# Patient Record
Sex: FEMALE | Race: WHITE | NOT HISPANIC OR LATINO | Employment: FULL TIME | URBAN - METROPOLITAN AREA
[De-identification: names, ages, dates, MRNs, and addresses within clinical notes are randomized per-mention and may not be internally consistent; named-entity substitution may affect disease eponyms.]

---

## 2022-01-28 ENCOUNTER — LAB (OUTPATIENT)
Dept: FAMILY MEDICINE | Facility: CLINIC | Age: 22
End: 2022-01-28
Payer: COMMERCIAL

## 2022-01-28 DIAGNOSIS — Z20.822 SUSPECTED COVID-19 VIRUS INFECTION: ICD-10-CM

## 2022-01-28 PROCEDURE — U0003 INFECTIOUS AGENT DETECTION BY NUCLEIC ACID (DNA OR RNA); SEVERE ACUTE RESPIRATORY SYNDROME CORONAVIRUS 2 (SARS-COV-2) (CORONAVIRUS DISEASE [COVID-19]), AMPLIFIED PROBE TECHNIQUE, MAKING USE OF HIGH THROUGHPUT TECHNOLOGIES AS DESCRIBED BY CMS-2020-01-R: HCPCS

## 2022-01-28 PROCEDURE — U0005 INFEC AGEN DETEC AMPLI PROBE: HCPCS

## 2022-01-29 LAB — SARS-COV-2 RNA RESP QL NAA+PROBE: NEGATIVE

## 2022-02-06 ENCOUNTER — HEALTH MAINTENANCE LETTER (OUTPATIENT)
Age: 22
End: 2022-02-06

## 2022-09-26 ENCOUNTER — OFFICE VISIT (OUTPATIENT)
Dept: OBGYN | Facility: CLINIC | Age: 22
End: 2022-09-26
Payer: COMMERCIAL

## 2022-09-26 VITALS — SYSTOLIC BLOOD PRESSURE: 102 MMHG | DIASTOLIC BLOOD PRESSURE: 60 MMHG | WEIGHT: 165 LBS

## 2022-09-26 DIAGNOSIS — Z30.011 ENCOUNTER FOR INITIAL PRESCRIPTION OF CONTRACEPTIVE PILLS: Primary | ICD-10-CM

## 2022-09-26 DIAGNOSIS — E28.2 PCOS (POLYCYSTIC OVARIAN SYNDROME): ICD-10-CM

## 2022-09-26 DIAGNOSIS — Z30.432 ENCOUNTER FOR IUD REMOVAL: ICD-10-CM

## 2022-09-26 PROCEDURE — 99204 OFFICE O/P NEW MOD 45 MIN: CPT | Mod: 25 | Performed by: OBSTETRICS & GYNECOLOGY

## 2022-09-26 PROCEDURE — 58301 REMOVE INTRAUTERINE DEVICE: CPT | Performed by: OBSTETRICS & GYNECOLOGY

## 2022-09-26 RX ORDER — CLINDAMYCIN PHOSPHATE 10 UG/ML
LOTION TOPICAL
COMMUNITY
Start: 2021-02-11 | End: 2023-08-07

## 2022-09-26 RX ORDER — LEVONORGESTREL / ETHINYL ESTRADIOL AND ETHINYL ESTRADIOL 150-30(84)
1 KIT ORAL DAILY
Qty: 91 TABLET | Refills: 3 | Status: SHIPPED | OUTPATIENT
Start: 2022-09-26 | End: 2023-08-07

## 2022-09-26 NOTE — PATIENT INSTRUCTIONS
"You can have fewer periods every year by taking your birth control pills continuously. After taking your pills regularly for 3 weeks, just skip the placebo pills (they will be a different color in your pack) and start the next pack. You can continue doing this as many packs in a row as you would like, though you may develop spotting or breakthrough bleeding. Most women prefer to take a 5 day pill break every 3 packs or 3 months to have a withdrawal bleed and \"reset\" the lining of the uterus.      Polycystic ovary syndrome (PCOS) is an important cause of both menstrual irregularity and androgen excess in women. It can cause irregular menstrual cycles, hirsutism, obesity, insulin resistance, and anovulatory infertility. Due to PCOS you may be at increased risk of type 2 diabetes, cardiovascular disease, endometrial hyperplasia, endometrial cancer, and infertility.   Diet and exercise for weight reduction are the first steps for overweight and obese women with PCOS. Weight loss can restore ovulatory cycles and improve metabolic risk. A lower carb diet is often beneficial. I recommended that you consider a modified and less strict version of Whole30 or Paleo, or a diabetic diet, with an emphasis on whole, unprocessed foods and high-quality carbohydrate sources, like vegetables, fruits, and complex, unprocessed grains.    Oral contraceptives (OCs) are the mainstay of pharmacologic therapy for women with PCOS for managing hyperandrogenism, treating menstrual dysfunction and protecting endometrial lining. Additionally, these provide contraception.   For women with PCOS who choose not to or cannot take OCs, there are alternative treatments for endometrial protection including intermittent or continuous progestin therapy, or a progestin-releasing intrauterine device (IUD). Depo provera, Nuvaring, the patch, and Nexplanon may also provide this protection in addition to contraception.  Additionally discussed metformin as an " alternative that can restore ovulatory menses in some women with PCOS, and that this can be particularly beneficial in women with documented hyperinsulinemia.

## 2022-09-26 NOTE — PROGRESS NOTES
SUBJECTIVE:   CC: Contraceptive counseling, acne/hirsuitism                                               Shantelle Mcclelland is a 21 year old female,, No LMP recorded. (Menstrual status: IUD). who presents today for mirena  IUD removal and contraceptive counseling. Her current IUD was placed 2 years ago. She previously a Bethany and liked it, however when she was due for replacement got a Mirena and has noted increased acne, hirsutism and weight gain with the IUD. She requests removal of the IUD because she wants to change her method of contraception.  Tried topical cleocin for acne with no success.  On further questioning, she reports that she has never had a regular period and always struggled with acne, but it became cystic after Mirena use.   She is interested in management of contraception, acne, and hirsutism.     Pap NIL 20    Problem list and histories reviewed & adjusted, as indicated.  Additional history: as documented.    ROS:  Const: 10lb weight gain in 1 year, no fever, chills  Skin: +acne and hirsutism    There is no problem list on file for this patient.    Past Surgical History:   Procedure Laterality Date     [place] teeth        Social History     Tobacco Use     Smoking status: Never Smoker     Smokeless tobacco: Never Used   Substance Use Topics     Alcohol use: Yes           clindamycin (CLEOCIN T) 1 % external lotion,     No current facility-administered medications on file prior to visit.    Allergies   Allergen Reactions     Sulfa Drugs Hives     Cephalosporins Hives and Rash     Clarithromycin Rash     Has been able to take azithromycin in the past without hives or rash.     Penicillins Hives and Rash       OBJECTIVE:   /60   Wt 74.8 kg (165 lb)    Const: sitting in chair in no acute distress, comfortable  Eyes: no scleral icterus, EOMI  CV: regular rate, well perfused  Pulm: no increased work of breathing, no cough  Skin: warm and dry, no rashes/lesions  Psych: mood stable,  appropriate affect  Abd: soft, no hepatosplenomegaly, non-tender to palpation  Neuro: A+Ox3   : External genitalia normal well-estrogenized, healthy tissue.  No obvious excoriations, lesions, or rashes. Bartholins, urethra, normal.  Normal moist pink vaginal mucosa.  SSE: Normal cervix, normal physiologic discharge. IUD strings visualized.    IUD Removal Procedure:    A speculum exam was performed and the cervix was visualized. The IUD string was visualized. Using ring forceps, the string  was grasped and the IUD removed intact.     ASSESSMENT/PLAN:                                                    Shantelle Mcclelland is a 21 year old female  here for contraception counseling. Her history of irregular periods, acne, and hirsutism predate IUD use and together due meet criteria for PCOS. We discussed polycystic ovarian syndrome at length. Explained that polycystic ovary syndrome (PCOS) is an important cause of both menstrual irregularity and androgen excess in women. Discussed that it can cause irregular menstrual cycles, hirsutism, obesity, insulin resistance, and anovulatory infertility. Specifically explained that due to PCOS she may be at increased risk of type 2 diabetes, cardiovascular disease, endometrial hyperplasia, endometrial cancer, and infertility.   Counseled Shantelle Mcclelland that diet and exercise for weight reduction are the first steps for overweight and obese women with PCOS. Weight loss can restore ovulatory cycles and improve metabolic risk.   Discussed that oral contraceptives (OCs) are the mainstay of pharmacologic therapy for women with PCOS for managing hyperandrogenism, treating menstrual dysfunction and protecting endometrial lining. Additionally, these provide contraception. Also reviewed laser hair removal or electrolysis for existing terminal hair growth. Discussed retinoic acid for acne control if needed can refer to Derm.  For women with PCOS who choose not to or cannot take OCs, we  discussed alternative treatments for endometrial protection including intermittent or continuous progestin therapy, or a progestin-releasing intrauterine device (IUD). Depo provera, Nuvaring, the patch, and Nexplanon may also provide this protection in addition to contraception.  She prefers continuous use oral contraceptives.       1. Encounter for initial prescription of contraceptive pills  - levonorgest-eth estrad 91-Day (SEASONIQUE) 0.15-0.03 &0.01 MG tablet; Take 1 tablet by mouth daily  Dispense: 91 tablet; Refill: 3    2. PCOS (polycystic ovarian syndrome)  - see counseling above.       3. IUD removal  - advised back up method for 7 days while initiating oral contraceptives.     Plan for now is oral contraceptives with follow up in 3 months.   In addition to the above listed procedure, 45 minutes spent on the date of the encounter doing chart review, patient visit and documentation      Cherelle Bray MD  Obstetrics and Gynecology   [facility]

## 2022-09-26 NOTE — NURSING NOTE
Chief Complaint   Patient presents with     IUD     Patient has Mirena IUD, and had it inserted 2020. Is not happy with this method of contraception. Patient reports weight gain, facial hair growth. Patient does not get period, but has occasional cramping.        Initial /60   Wt 74.8 kg (165 lb)  There is no height or weight on file to calculate BMI.  BP completed using cuff size: regular    Questioned patient about current smoking habits.  Pt. has never smoked.          The following HM Due: pap smear      Jessika Hernandez CMA

## 2022-10-03 ENCOUNTER — HEALTH MAINTENANCE LETTER (OUTPATIENT)
Age: 22
End: 2022-10-03

## 2023-01-09 ENCOUNTER — OFFICE VISIT (OUTPATIENT)
Dept: OBGYN | Facility: CLINIC | Age: 23
End: 2023-01-09
Payer: COMMERCIAL

## 2023-01-09 VITALS — DIASTOLIC BLOOD PRESSURE: 70 MMHG | SYSTOLIC BLOOD PRESSURE: 112 MMHG | WEIGHT: 170.6 LBS

## 2023-01-09 DIAGNOSIS — N93.9 ABNORMAL UTERINE BLEEDING (AUB): ICD-10-CM

## 2023-01-09 DIAGNOSIS — L68.0 HIRSUTISM: ICD-10-CM

## 2023-01-09 DIAGNOSIS — Z30.015 ENCOUNTER FOR INITIAL PRESCRIPTION OF VAGINAL RING HORMONAL CONTRACEPTIVE: ICD-10-CM

## 2023-01-09 DIAGNOSIS — L70.0 CYSTIC ACNE: ICD-10-CM

## 2023-01-09 DIAGNOSIS — E28.2 PCOS (POLYCYSTIC OVARIAN SYNDROME): Primary | ICD-10-CM

## 2023-01-09 PROCEDURE — 99214 OFFICE O/P EST MOD 30 MIN: CPT | Performed by: OBSTETRICS & GYNECOLOGY

## 2023-01-09 RX ORDER — ETONOGESTREL AND ETHINYL ESTRADIOL VAGINAL RING .015; .12 MG/D; MG/D
1 RING VAGINAL
Qty: 3 EACH | Refills: 4 | Status: SHIPPED | OUTPATIENT
Start: 2023-01-09

## 2023-01-09 NOTE — NURSING NOTE
Chief Complaint   Patient presents with     Medication Follow-up     Patient has PCOS, and was seen on 22 and started taking Seasonique to help with PCOS symptoms. Patient is not sure she would like to continue taking the pill- she has a hard time remembering to take it on time, didn't really help with her acne. Would like to discuss other options.        Initial /70   Wt 77.4 kg (170 lb 9.6 oz)  There is no height or weight on file to calculate BMI.  BP completed using cuff size: regular    Questioned patient about current smoking habits.  Pt. has never smoked.          The following HM Due: NONE      Jessika Hernandez CMA

## 2023-01-09 NOTE — PROGRESS NOTES
SUBJECTIVE:   CC: PCOS fu                                               Shantelle Mcclelland is a 22 year old  female who presents to clinic today for follow up of PCOS. She started seasonique in October and hasn't seen much improvement in acne since then. Her mirena IUD was removed at that time as well. Her acne fluctuates quite a bit, it was very bad in November, almost absent now.  Menarche age 14, first IUD at age 16. Recalls that cycles were about every 6 weeks.   She is having a hard time taking the pill daily. Would like to try nuvaring after discussion of benefits of estrogen/progesterone products in the setting of pcos. She is considering laser hair removal for her chin as well.    Problem list and histories reviewed & adjusted, as indicated.  Additional history: as documented.       clindamycin (CLEOCIN T) 1 % external lotion,   levonorgest-eth estrad -Day (SEASONIQUE) 0.15-0.03 &0.01 MG tablet, Take 1 tablet by mouth daily    No current facility-administered medications on file prior to visit.    Allergies   Allergen Reactions     Sulfa Drugs Hives     Cephalosporins Hives and Rash     Clarithromycin Rash     Has been able to take azithromycin in the past without hives or rash.     Penicillins Hives and Rash       OBJECTIVE:   /70   Wt 77.4 kg (170 lb 9.6 oz)    Const: sitting in chair in no acute distress, comfortable  Pulm: no increased work of breathing, no cough  Psych: mood stable, appropriate affect  Neuro: A+Ox3   Skin: minimal acne, she does share pictures of cystic acne 1 month ago    ASSESSMENT/PLAN:                                                    Shantelle Mcclelland is a 22 year old female  here for follow up of combined oral contraceptives start and further discussion of PCOS. She has a hard time with compliance of a daily pill and requests nuvaring.     1. PCOS (polycystic ovarian syndrome)  - etonogestrel-ethinyl estradiol (NUVARING) 0.12-0.015 MG/24HR vaginal ring; Place 1 each  vaginally every 28 days  Dispense: 3 each; Refill: 4  - TSH with free T4 reflex; Future  - Prolactin; Future  - Testosterone Free and Total; Future  - DHEA sulfate; Future    - she is tearful today in discussing her frustration with this diagnosis, that there is not a cure, and that it affect appearance, fertility, and long term health. We reviewed improved ovulation/menstrual regularity with a 5-10% weight loss including strategies to achieve this. We discussed laser hair removal and topical treatments for acne. She would like to see dermatology. We discussed letrozole if indicated when she is ready to try to conceive.     2. Abnormal uterine bleeding (AUB)    - etonogestrel-ethinyl estradiol (NUVARING) 0.12-0.015 MG/24HR vaginal ring; Place 1 each vaginally every 28 days  Dispense: 3 each; Refill: 4    3. Encounter for initial prescription of vaginal ring hormonal contraceptive    - etonogestrel-ethinyl estradiol (NUVARING) 0.12-0.015 MG/24HR vaginal ring; Place 1 each vaginally every 28 days  Dispense: 3 each; Refill: 4    4. Cystic acne  - Adult Dermatology Referral; Future    5. Hirsutism  - Testosterone Free and Total; Future  - DHEA sulfate; Future     Plan labs and initiate nuvaring. Discussed immediate start vs waiting until 4 weeks into her current pill pack. Return in 2 months to assess her satisfaction with this management.     30 minutes spent on the date of the encounter doing chart review, patient visit and documentation      Cherelle Bray MD  Obstetrics and Gynecology   [facility]

## 2023-01-09 NOTE — PATIENT INSTRUCTIONS
Shantelle,   I added a few labs for you to draw at your convenience for further evaluation of your hyperandrogenism (acne/coarse hair growth) and abnormal bleeding. These primarily just double check that there is nothing additional going on to PCOS.

## 2023-02-12 ENCOUNTER — HEALTH MAINTENANCE LETTER (OUTPATIENT)
Age: 23
End: 2023-02-12

## 2023-08-07 ENCOUNTER — OFFICE VISIT (OUTPATIENT)
Dept: MIDWIFE SERVICES | Facility: CLINIC | Age: 23
End: 2023-08-07
Payer: COMMERCIAL

## 2023-08-07 VITALS — DIASTOLIC BLOOD PRESSURE: 70 MMHG | SYSTOLIC BLOOD PRESSURE: 128 MMHG | WEIGHT: 164 LBS

## 2023-08-07 DIAGNOSIS — Z30.430 ENCOUNTER FOR INSERTION OF INTRAUTERINE CONTRACEPTIVE DEVICE: Primary | ICD-10-CM

## 2023-08-07 PROCEDURE — 58300 INSERT INTRAUTERINE DEVICE: CPT | Performed by: ADVANCED PRACTICE MIDWIFE

## 2023-08-07 RX ORDER — COPPER 313.4 MG/1
1 INTRAUTERINE DEVICE INTRAUTERINE ONCE
Status: COMPLETED
Start: 2023-08-07 | End: 2023-08-14

## 2023-08-07 RX ORDER — COPPER 313.4 MG/1
1 INTRAUTERINE DEVICE INTRAUTERINE ONCE
COMMUNITY

## 2023-08-07 NOTE — PROGRESS NOTES
IUD Insertion:  CONSULT:    Is a pregnancy test required: No.  Was a consent obtained?  Yes    Subjective: Shantelle Mcclelland is a 22 year old  presents for IUD and desires Paragard type IUD.    Patient has been given the opportunity to ask questions about all forms of birth control, including all options appropriate for Shantelle Mcclelland. Discussed that no method of birth control, except abstinence is 100% effective against pregnancy or sexually transmitted infection.     Shantelle Mcclelland understands she may have the IUD removed at any time. IUD should be removed by a health care provider.    The entire insertion procedure was reviewed with the patient, including care after placement.    Patient's last menstrual period was 2023 (exact date). Has current contraception. No allergy to betadine or shellfish. Patient declines STD screening  No results found for: HCG      /70   Wt 74.4 kg (164 lb)   LMP 2023 (Exact Date)     Pelvic Exam:   EG/BUS: normal genital architecture without lesions, erythema or abnormal secretions.   Vagina: moist, pink, rugae with physiologic discharge and secretions  Cervix: nulli-parous no lesions and pink, moist, closed, without lesion or CMT  Uterus: anteverted position, mobile, no pain  Adnexa: within normal limits and no masses, nodularity, tenderness    PROCEDURE NOTE: -- IUD Insertion    Reason for Insertion: contraception    Premedicated with ibuprofen.  Under sterile technique, cervix was visualized with speculum and prepped with Betadine solution swab x 3. Tenaculum was placed for stability. The uterus was gently straightened and sounded to 6.5 cm. IUD prepared for placement, and IUD inserted according to 's instructions without difficulty or significant resitance, and deployed at the fundus. The strings were visualized and trimmed to 3.0 cm from the external os. Tenaculum was removed and hemostasis noted. Speculum removed.  Patient tolerated procedure  well.    Lot # per MAR  Exp: per MAR    EBL: minimal    Complications: none    ASSESSMENT:     ICD-10-CM    1. Encounter for insertion of intrauterine contraceptive device  Z30.430 paragard intrauterine copper IUD device 1 each     INSERTION INTRAUTERINE DEVICE           PLAN:    Given 's handouts, including when to have IUD removed, list of danger s/sx, side effects and follow up recommended. Encouraged condom use for prevention of STD. Back up contraception advised for 7 days if progestin method. Advised to call for any fever, for prolonged or severe pain or bleeding, abnormal vaginal discharge, or unable to palpate strings. She was advised to use pain medications (ibuprofen) as needed for mild to moderate pain. Advised to follow-up in clinic in 4-6 weeks for IUD string check if unable to find strings or as directed by provider.     RAUDEL Espinoza CNM

## 2023-08-14 RX ADMIN — COPPER 1 EACH: 313.4 INTRAUTERINE DEVICE INTRAUTERINE at 14:38

## 2024-03-10 ENCOUNTER — HEALTH MAINTENANCE LETTER (OUTPATIENT)
Age: 24
End: 2024-03-10

## 2025-03-16 ENCOUNTER — HEALTH MAINTENANCE LETTER (OUTPATIENT)
Age: 25
End: 2025-03-16